# Patient Record
(demographics unavailable — no encounter records)

---

## 2025-05-29 NOTE — CONSULT LETTER
[Dear  ___] : Dear  [unfilled], [Consult Letter:] : I had the pleasure of evaluating your patient, [unfilled]. [Please see my note below.] : Please see my note below. [Consult Closing:] : Thank you very much for allowing me to participate in the care of this patient.  If you have any questions, please do not hesitate to contact me. [Sincerely,] : Sincerely, [FreeTextEntry3] : Dr Tarun Ireland JR.

## 2025-05-29 NOTE — PHYSICAL EXAM
[FreeTextEntry1] : Exam today with the splint removed reveals minimal swelling to the ankle and foot she has full motion to the ankle subtalar joint and all toes he has very minor discomfort over the lateral ligaments none so over either malleolus there is no instability on stress the distal foot is otherwise unremarkable as well neurovascular status is intact  Review of x-rays [facility] right ankle and left foot multiple x-rays reviewed by myself no obvious fractures noted.

## 2025-05-29 NOTE — HISTORY OF PRESENT ILLNESS
[FreeTextEntry1] : This 12-year-old healthy child with normal development is seen for evaluation of his right ankle and foot.  He was well until 3 days ago when he fell sustaining injury.  He had x-rays taken at [facility] where x-rays were taken she was sent out in a posterior splint on crutches.  She is feeling better prior to this no significant complaints past medical history is negative

## 2025-05-29 NOTE — ASSESSMENT
[FreeTextEntry1] : Impression: Minor sprain right ankle.  This patient will discontinue the splint to allow for motion exercises and progressive weightbearing as tolerated.  Return to gym and sports in 10 days return here as needed